# Patient Record
Sex: FEMALE | URBAN - METROPOLITAN AREA
[De-identification: names, ages, dates, MRNs, and addresses within clinical notes are randomized per-mention and may not be internally consistent; named-entity substitution may affect disease eponyms.]

---

## 2022-11-07 ENCOUNTER — OFFICE VISIT (OUTPATIENT)
Dept: URBAN - METROPOLITAN AREA CLINIC 40 | Facility: CLINIC | Age: 27
End: 2022-11-07
Payer: COMMERCIAL

## 2022-11-07 DIAGNOSIS — H52.13 MYOPIA, BILATERAL: Primary | ICD-10-CM

## 2022-11-07 PROCEDURE — 92004 COMPRE OPH EXAM NEW PT 1/>: CPT | Performed by: OPTOMETRIST

## 2022-11-07 ASSESSMENT — VISUAL ACUITY
OD: 20/20
OS: 20/20

## 2022-11-07 ASSESSMENT — KERATOMETRY
OS: 43.00
OD: 43.13

## 2022-11-07 ASSESSMENT — INTRAOCULAR PRESSURE
OD: 17
OS: 16

## 2022-11-07 NOTE — IMPRESSION/PLAN
Impression: Myopia, bilateral: H52.13. Plan: Discussed diagnosis in detail with patient. New glasses Rx was given today. Recommend UV protection. Potential for initial adaptation discussed. RTC for dry eye evaluation and possible Cequa.

## 2022-12-19 ENCOUNTER — OFFICE VISIT (OUTPATIENT)
Dept: URBAN - METROPOLITAN AREA CLINIC 40 | Facility: CLINIC | Age: 27
End: 2022-12-19
Payer: COMMERCIAL

## 2022-12-19 DIAGNOSIS — H04.123 DRY EYE SYNDROME OF BILATERAL LACRIMAL GLANDS: Primary | ICD-10-CM

## 2022-12-19 DIAGNOSIS — H47.233 GLAUCOMATOUS OPTIC ATROPHY, BILATERAL: ICD-10-CM

## 2022-12-19 PROCEDURE — 99214 OFFICE O/P EST MOD 30 MIN: CPT | Performed by: OPTOMETRIST

## 2022-12-19 ASSESSMENT — KERATOMETRY
OD: 43.25
OS: 43.25

## 2022-12-19 ASSESSMENT — INTRAOCULAR PRESSURE
OS: 16
OD: 16

## 2024-04-25 ENCOUNTER — OFFICE VISIT (OUTPATIENT)
Dept: URBAN - METROPOLITAN AREA CLINIC 23 | Facility: CLINIC | Age: 29
End: 2024-04-25
Payer: COMMERCIAL

## 2024-04-25 DIAGNOSIS — H10.45 OTHER CHRONIC ALLERGIC CONJUNCTIVITIS: ICD-10-CM

## 2024-04-25 DIAGNOSIS — H04.123 DRY EYE SYNDROME OF BILATERAL LACRIMAL GLANDS: Primary | ICD-10-CM

## 2024-04-25 DIAGNOSIS — H40.013 OPEN ANGLE WITH BORDERLINE FINDINGS, LOW RISK, BILATERAL: ICD-10-CM

## 2024-04-25 PROCEDURE — 99214 OFFICE O/P EST MOD 30 MIN: CPT

## 2024-04-25 PROCEDURE — 92133 CPTRZD OPH DX IMG PST SGM ON: CPT

## 2024-04-25 RX ORDER — CYCLOSPORINE 0.5 MG/ML
0.05 % EMULSION OPHTHALMIC
Qty: 120 | Refills: 6 | Status: ACTIVE
Start: 2024-04-25

## 2024-04-25 ASSESSMENT — KERATOMETRY
OS: 42.88
OD: 43.13

## 2024-04-25 ASSESSMENT — INTRAOCULAR PRESSURE
OS: 20
OD: 19

## 2024-06-29 ENCOUNTER — OFFICE VISIT (OUTPATIENT)
Dept: URBAN - METROPOLITAN AREA CLINIC 32 | Facility: CLINIC | Age: 29
End: 2024-06-29
Payer: COMMERCIAL

## 2024-06-29 DIAGNOSIS — H16.223 KERATOCONJUNCTIVITIS SICCA, NOT SPECIFIED AS SJ”GREN'S, BILATERAL: ICD-10-CM

## 2024-06-29 DIAGNOSIS — H04.123 DRY EYE SYNDROME: ICD-10-CM

## 2024-06-29 DIAGNOSIS — H52.13 MYOPIA, BILATERAL: Primary | ICD-10-CM

## 2024-06-29 PROCEDURE — 92014 COMPRE OPH EXAM EST PT 1/>: CPT | Performed by: OPTOMETRIST

## 2024-06-29 RX ORDER — LOTEPREDNOL ETABONATE 2 MG/ML
0.2 % SUSPENSION/ DROPS OPHTHALMIC
Qty: 5 | Refills: 6 | Status: ACTIVE
Start: 2024-06-29

## 2024-06-29 RX ORDER — CYCLOSPORINE 0 G/ML
0.09 % SOLUTION/ DROPS OPHTHALMIC; TOPICAL
Qty: 180 | Refills: 10 | Status: ACTIVE
Start: 2024-06-29

## 2024-06-29 ASSESSMENT — KERATOMETRY
OS: 43.13
OD: 42.88

## 2024-06-29 ASSESSMENT — INTRAOCULAR PRESSURE
OS: 26
OD: 25

## 2024-06-29 ASSESSMENT — VISUAL ACUITY
OD: 20/20
OS: 20/25

## 2024-10-11 ENCOUNTER — OFFICE VISIT (OUTPATIENT)
Dept: URBAN - METROPOLITAN AREA CLINIC 32 | Facility: CLINIC | Age: 29
End: 2024-10-11
Payer: COMMERCIAL

## 2024-10-11 DIAGNOSIS — H04.123 DRY EYE SYNDROME: ICD-10-CM

## 2024-10-11 DIAGNOSIS — H16.223 KERATOCONJUNCTIVITIS SICCA, NOT SPECIFIED AS SJ”GREN'S, BILATERAL: Primary | ICD-10-CM

## 2024-10-11 PROCEDURE — 99213 OFFICE O/P EST LOW 20 MIN: CPT | Performed by: OPTOMETRIST

## 2024-10-11 ASSESSMENT — VISUAL ACUITY
OS: 20/20
OD: 20/20

## 2024-10-11 ASSESSMENT — INTRAOCULAR PRESSURE
OD: 14
OS: 16